# Patient Record
Sex: FEMALE | URBAN - METROPOLITAN AREA
[De-identification: names, ages, dates, MRNs, and addresses within clinical notes are randomized per-mention and may not be internally consistent; named-entity substitution may affect disease eponyms.]

---

## 2017-04-17 ENCOUNTER — APPOINTMENT (RX ONLY)
Dept: URBAN - METROPOLITAN AREA CLINIC 12 | Facility: CLINIC | Age: 67
Setting detail: DERMATOLOGY
End: 2017-04-17

## 2017-04-17 DIAGNOSIS — L21.8 OTHER SEBORRHEIC DERMATITIS: ICD-10-CM

## 2017-04-17 DIAGNOSIS — L94.2 CALCINOSIS CUTIS: ICD-10-CM

## 2017-04-17 DIAGNOSIS — D18.0 HEMANGIOMA: ICD-10-CM

## 2017-04-17 PROBLEM — L29.8 OTHER PRURITUS: Status: ACTIVE | Noted: 2017-04-17

## 2017-04-17 PROBLEM — D18.01 HEMANGIOMA OF SKIN AND SUBCUTANEOUS TISSUE: Status: ACTIVE | Noted: 2017-04-17

## 2017-04-17 PROCEDURE — ? COUNSELING

## 2017-04-17 PROCEDURE — 99203 OFFICE O/P NEW LOW 30 MIN: CPT

## 2017-04-17 PROCEDURE — ? TREATMENT REGIMEN

## 2017-04-17 PROCEDURE — ? IN-HOUSE DISPENSING PHARMACY

## 2017-04-17 ASSESSMENT — LOCATION ZONE DERM
LOCATION ZONE: TRUNK
LOCATION ZONE: FACE
LOCATION ZONE: EYELID

## 2017-04-17 ASSESSMENT — LOCATION SIMPLE DESCRIPTION DERM
LOCATION SIMPLE: LEFT EYEBROW
LOCATION SIMPLE: RIGHT FOREHEAD
LOCATION SIMPLE: RIGHT SUPERIOR EYELID
LOCATION SIMPLE: CHEST
LOCATION SIMPLE: INFERIOR FOREHEAD

## 2017-04-17 ASSESSMENT — LOCATION DETAILED DESCRIPTION DERM
LOCATION DETAILED: RIGHT MEDIAL SUPERIOR EYELID
LOCATION DETAILED: LEFT CENTRAL EYEBROW
LOCATION DETAILED: RIGHT FOREHEAD
LOCATION DETAILED: INFERIOR MID FOREHEAD
LOCATION DETAILED: RIGHT LATERAL SUPERIOR CHEST

## 2017-04-17 NOTE — PROCEDURE: MIPS QUALITY
Quality 111:Pneumonia Vaccination Status For Older Adults: Pneumococcal Vaccination Previously Received
Quality 110: Preventive Care And Screening: Influenza Immunization: Influenza Immunization Administered during Influenza season
Quality 130: Documentation Of Current Medications In The Medical Record: Current Medications Documented
Detail Level: Detailed

## 2017-04-17 NOTE — PROCEDURE: IN-HOUSE DISPENSING PHARMACY
Product 52 Amount/Unit (Numbers Only): 0
Product 62 Unit Type: mg
Name Of Product 4: [place] 0.1% Gel [id number] - Niacinamide 4%/Tazoratene 0.1%
Product 2 Unit Type: ml
Product 32 Amount/Unit (Numbers Only): 15
Product 25 Units Dispensed: 1
Name Of Product 31: Antibacterial Wound Ointment [id number] - Aloe Vera 0.2%/Lidocaine 2%/Mupirocin 2%/Tranilast 1%
Product 2 Application Directions: Apply one pump (pea-sized amount) to entire face at bed time
Product 11 Application Directions: Apply to Affected areas three times weekly for one month.
Product 32 Price/Unit (In Dollars): 35.00
Product 4 Application Directions: Apply pea-sized amount to affected areas QHS as tolerated.  5 refills
Product 22 Amount/Unit (Numbers Only): 60
Name Of Product 2: Tret 0.05% Cream [id number] - Niacinamide 4%/Tretinoin 0.05%
Name Of Product 22: Clob Ointment [id number] - Clobetasol Propionate 0.05%/Niacinamide 4%
Product 31 Amount/Unit (Numbers Only): 30
Product 4 Price/Unit (In Dollars): 45.00
Product 23 Amount/Unit (Numbers Only): 30
Name Of Product 11: Imiquimod Combo Gel [id number] - Imiquimod 5%/Levocetirizine 1%/Tretinoin 0.05%
Name Of Product 26: Fluocinolone Scalp & Body Oil [id number] - Fluocinolone Acetonide 0.01% In Emu and Olive Oil
Product 15 Application Directions: Apply to warts every night
Product 22 Unit Type: grams
Product 26 Amount/Unit (Numbers Only): 60
Name Of Product 24: Triam Dermatitis Cream [id number] - Niacinamide 4%/Triamcinolone Acetonide 0.1%
Product 25 Application Directions: Apply to affected areas twice daily as needed.  Refills x 2
Name Of Product 25: Desonide Dermatitis Cream [id number] - Desonide 0.05%/Niacinamide 4%
Product 31 Application Directions: Apply to wound QD-BID for 1-2 weeks
Product 15 Price/Unit (In Dollars): 35
Detail Level: Simple
Name Of Product 32: Anti-Fungal Nail Solution [id number] - Ciclopirox 8%/Fluconazole 1%/Terbinafine 1%
Name Of Product 3: Tret 0.1% Cream 557112 - Niacinamide 4%/Tretinoin 0.1%
Product 12 Application Directions: Apply to face QD
Product 22 Price/Unit (In Dollars): 40.00
Name Of Product 12: Rosacea Cream [id number] - Ivermectin 1%/Metronidazole 1%/Niacinamide 4%/Potassium Azeloyl Diglycinate 5%
Name Of Product 23: Clob Cream [id number] - Clobetasol Propionate 0.05%/Niacinamide 4%
Product 31 Price/Unit (In Dollars): 30.00
Product 23 Application Directions: Apply to. Affected areas BID x2 weeks
Render Product Pricing In Note: Yes
Name Of Product 21: Clob Solution [id number] - Clobetasol Propionate 0.05%/Niacinamide 4%
Name Of Product 33: Wart Gel [id number] - Cimetidine 5%/Ibuprofen 2%/Salicylic Acid 17%
Name Of Product 1: Tret 0.025% Cream [id number] - Niacinamide 4%/Tretinoin 0.025%
Product 15 Amount/Unit (Numbers Only): 35

## 2018-01-29 NOTE — HPI: EVALUATION OF SKIN LESION(S)
How Severe Are Your Spot(S)?: mild
Have Your Spot(S) Been Treated In The Past?: has not been treated
Hpi Title: Evaluation of Skin Lesions
dietitian/nutrition services

## 2019-08-26 ENCOUNTER — APPOINTMENT (RX ONLY)
Dept: URBAN - METROPOLITAN AREA CLINIC 12 | Facility: CLINIC | Age: 69
Setting detail: DERMATOLOGY
End: 2019-08-26

## 2019-08-26 DIAGNOSIS — L57.0 ACTINIC KERATOSIS: ICD-10-CM

## 2019-08-26 DIAGNOSIS — L21.8 OTHER SEBORRHEIC DERMATITIS: ICD-10-CM

## 2019-08-26 DIAGNOSIS — Z71.89 OTHER SPECIFIED COUNSELING: ICD-10-CM

## 2019-08-26 DIAGNOSIS — D18.0 HEMANGIOMA: ICD-10-CM

## 2019-08-26 PROBLEM — D18.01 HEMANGIOMA OF SKIN AND SUBCUTANEOUS TISSUE: Status: ACTIVE | Noted: 2019-08-26

## 2019-08-26 PROCEDURE — 17003 DESTRUCT PREMALG LES 2-14: CPT

## 2019-08-26 PROCEDURE — ? LIQUID NITROGEN

## 2019-08-26 PROCEDURE — ? TREATMENT REGIMEN

## 2019-08-26 PROCEDURE — 17000 DESTRUCT PREMALG LESION: CPT

## 2019-08-26 PROCEDURE — 99214 OFFICE O/P EST MOD 30 MIN: CPT | Mod: 25

## 2019-08-26 PROCEDURE — ? COUNSELING

## 2019-08-26 ASSESSMENT — LOCATION DETAILED DESCRIPTION DERM
LOCATION DETAILED: RIGHT MEDIAL TEMPLE
LOCATION DETAILED: LEFT CENTRAL EYEBROW
LOCATION DETAILED: RIGHT MEDIAL ZYGOMA
LOCATION DETAILED: RIGHT LATERAL SUPERIOR CHEST
LOCATION DETAILED: RIGHT CENTRAL EYEBROW

## 2019-08-26 ASSESSMENT — LOCATION SIMPLE DESCRIPTION DERM
LOCATION SIMPLE: RIGHT EYEBROW
LOCATION SIMPLE: RIGHT ZYGOMA
LOCATION SIMPLE: RIGHT TEMPLE
LOCATION SIMPLE: LEFT EYEBROW
LOCATION SIMPLE: CHEST

## 2019-08-26 ASSESSMENT — PAIN INTENSITY VAS: HOW INTENSE IS YOUR PAIN 0 BEING NO PAIN, 10 BEING THE MOST SEVERE PAIN POSSIBLE?: NO PAIN

## 2019-08-26 ASSESSMENT — LOCATION ZONE DERM
LOCATION ZONE: FACE
LOCATION ZONE: TRUNK

## 2019-08-26 ASSESSMENT — SEVERITY ASSESSMENT: HOW SEVERE IS THIS PATIENT'S CONDITION?: MILD

## 2019-08-26 NOTE — PROCEDURE: LIQUID NITROGEN
Render Note In Bullet Format When Appropriate: No
Number Of Freeze-Thaw Cycles: 2 freeze-thaw cycles
Consent: The patient's consent was obtained including but not limited to risks of crusting, scabbing, blistering, scarring, darker or lighter pigmentary change, recurrence, incomplete removal and infection.
Duration Of Freeze Thaw-Cycle (Seconds): 5
Detail Level: Detailed
Post-Care Instructions: I reviewed with the patient in detail post-care instructions. Patient is to wear sunprotection, and avoid picking at any of the treated lesions. Pt may apply Vaseline to crusted or scabbing areas.

## 2019-08-26 NOTE — PROCEDURE: COUNSELING
Detail Level: Simple
Patient Specific Counseling (Will Not Stick From Patient To Patient): Recommended 50 SPF for patient
Detail Level: Generalized

## 2020-01-21 ENCOUNTER — APPOINTMENT (RX ONLY)
Dept: URBAN - METROPOLITAN AREA CLINIC 12 | Facility: CLINIC | Age: 70
Setting detail: DERMATOLOGY
End: 2020-01-21

## 2020-01-21 DIAGNOSIS — L82.0 INFLAMED SEBORRHEIC KERATOSIS: ICD-10-CM

## 2020-01-21 DIAGNOSIS — Z41.9 ENCOUNTER FOR PROCEDURE FOR PURPOSES OTHER THAN REMEDYING HEALTH STATE, UNSPECIFIED: ICD-10-CM

## 2020-01-21 PROCEDURE — ? COSMETIC CONSULTATION: BOTULINUM TOXIN

## 2020-01-21 PROCEDURE — 17110 DESTRUCTION B9 LES UP TO 14: CPT

## 2020-01-21 PROCEDURE — ? COSMETIC CONSULTATION - MICRO-NEEDLING

## 2020-01-21 PROCEDURE — ? LIQUID NITROGEN

## 2020-01-21 ASSESSMENT — LOCATION DETAILED DESCRIPTION DERM
LOCATION DETAILED: RIGHT POSTERIOR SHOULDER
LOCATION DETAILED: LEFT SUPERIOR CENTRAL BUCCAL CHEEK

## 2020-01-21 ASSESSMENT — LOCATION SIMPLE DESCRIPTION DERM
LOCATION SIMPLE: RIGHT SHOULDER
LOCATION SIMPLE: LEFT CHEEK

## 2020-01-21 ASSESSMENT — LOCATION ZONE DERM
LOCATION ZONE: FACE
LOCATION ZONE: ARM

## 2020-01-21 NOTE — PROCEDURE: LIQUID NITROGEN
Duration Of Freeze Thaw-Cycle (Seconds): 5-10
Include Z78.9 (Other Specified Conditions Influencing Health Status) As An Associated Diagnosis?: No
Post-Care Instructions: No charge per MM
Medical Necessity Information: It is in your best interest to select a reason for this procedure from the list below. All of these items fulfill various CMS LCD requirements except the new and changing color options.
Number Of Freeze-Thaw Cycles: 2 freeze-thaw cycles
Detail Level: Detailed
Medical Necessity Clause: This procedure was medically necessary because the lesions that were treated were: irritated

## 2020-09-14 ENCOUNTER — APPOINTMENT (RX ONLY)
Dept: URBAN - METROPOLITAN AREA CLINIC 12 | Facility: CLINIC | Age: 70
Setting detail: DERMATOLOGY
End: 2020-09-14

## 2020-09-14 VITALS — TEMPERATURE: 97.7 F

## 2020-09-14 DIAGNOSIS — L82.1 OTHER SEBORRHEIC KERATOSIS: ICD-10-CM

## 2020-09-14 DIAGNOSIS — D18.0 HEMANGIOMA: ICD-10-CM

## 2020-09-14 PROBLEM — D18.01 HEMANGIOMA OF SKIN AND SUBCUTANEOUS TISSUE: Status: ACTIVE | Noted: 2020-09-14

## 2020-09-14 PROCEDURE — 99214 OFFICE O/P EST MOD 30 MIN: CPT

## 2020-09-14 ASSESSMENT — LOCATION DETAILED DESCRIPTION DERM
LOCATION DETAILED: RIGHT LATERAL SUPERIOR CHEST
LOCATION DETAILED: LEFT INFERIOR MEDIAL UPPER BACK

## 2020-09-14 ASSESSMENT — LOCATION ZONE DERM: LOCATION ZONE: TRUNK

## 2020-09-14 ASSESSMENT — PAIN INTENSITY VAS: HOW INTENSE IS YOUR PAIN 0 BEING NO PAIN, 10 BEING THE MOST SEVERE PAIN POSSIBLE?: NO PAIN

## 2020-09-14 ASSESSMENT — LOCATION SIMPLE DESCRIPTION DERM
LOCATION SIMPLE: LEFT UPPER BACK
LOCATION SIMPLE: CHEST

## 2021-09-20 ENCOUNTER — APPOINTMENT (RX ONLY)
Dept: URBAN - METROPOLITAN AREA CLINIC 12 | Facility: CLINIC | Age: 71
Setting detail: DERMATOLOGY
End: 2021-09-20

## 2021-09-20 DIAGNOSIS — D18.0 HEMANGIOMA: ICD-10-CM

## 2021-09-20 DIAGNOSIS — L82.1 OTHER SEBORRHEIC KERATOSIS: ICD-10-CM

## 2021-09-20 DIAGNOSIS — L81.4 OTHER MELANIN HYPERPIGMENTATION: ICD-10-CM

## 2021-09-20 DIAGNOSIS — Z87.2 PERSONAL HISTORY OF DISEASES OF THE SKIN AND SUBCUTANEOUS TISSUE: ICD-10-CM

## 2021-09-20 DIAGNOSIS — F42.4 EXCORIATION (SKIN-PICKING) DISORDER: ICD-10-CM

## 2021-09-20 DIAGNOSIS — D22 MELANOCYTIC NEVI: ICD-10-CM

## 2021-09-20 DIAGNOSIS — Z71.89 OTHER SPECIFIED COUNSELING: ICD-10-CM

## 2021-09-20 DIAGNOSIS — Z80.8 FAMILY HISTORY OF MALIGNANT NEOPLASM OF OTHER ORGANS OR SYSTEMS: ICD-10-CM

## 2021-09-20 PROBLEM — S20.402A UNSPECIFIED SUPERFICIAL INJURIES OF LEFT BACK WALL OF THORAX, INITIAL ENCOUNTER: Status: ACTIVE | Noted: 2021-09-20

## 2021-09-20 PROBLEM — D18.01 HEMANGIOMA OF SKIN AND SUBCUTANEOUS TISSUE: Status: ACTIVE | Noted: 2021-09-20

## 2021-09-20 PROBLEM — D22.5 MELANOCYTIC NEVI OF TRUNK: Status: ACTIVE | Noted: 2021-09-20

## 2021-09-20 PROCEDURE — ? SUNSCREEN RECOMMENDATIONS

## 2021-09-20 PROCEDURE — 99213 OFFICE O/P EST LOW 20 MIN: CPT

## 2021-09-20 PROCEDURE — ? COUNSELING

## 2021-09-20 ASSESSMENT — LOCATION DETAILED DESCRIPTION DERM
LOCATION DETAILED: RIGHT CLAVICULAR NECK
LOCATION DETAILED: RIGHT PROXIMAL POSTERIOR THIGH
LOCATION DETAILED: RIGHT LATERAL SUPERIOR CHEST
LOCATION DETAILED: LEFT ANTERIOR PROXIMAL UPPER ARM
LOCATION DETAILED: EPIGASTRIC SKIN
LOCATION DETAILED: LEFT CLAVICULAR SKIN
LOCATION DETAILED: RIGHT MEDIAL SUPERIOR CHEST
LOCATION DETAILED: LEFT SUPERIOR UPPER BACK

## 2021-09-20 ASSESSMENT — PAIN INTENSITY VAS: HOW INTENSE IS YOUR PAIN 0 BEING NO PAIN, 10 BEING THE MOST SEVERE PAIN POSSIBLE?: NO PAIN

## 2021-09-20 ASSESSMENT — LOCATION SIMPLE DESCRIPTION DERM
LOCATION SIMPLE: LEFT CLAVICULAR SKIN
LOCATION SIMPLE: CHEST
LOCATION SIMPLE: LEFT UPPER ARM
LOCATION SIMPLE: ABDOMEN
LOCATION SIMPLE: RIGHT ANTERIOR NECK
LOCATION SIMPLE: RIGHT POSTERIOR THIGH
LOCATION SIMPLE: LEFT UPPER BACK

## 2021-09-20 ASSESSMENT — LOCATION ZONE DERM
LOCATION ZONE: TRUNK
LOCATION ZONE: ARM
LOCATION ZONE: NECK
LOCATION ZONE: LEG

## 2021-09-20 NOTE — PROCEDURE: SUNSCREEN RECOMMENDATIONS

## 2022-09-19 ENCOUNTER — APPOINTMENT (RX ONLY)
Dept: URBAN - METROPOLITAN AREA CLINIC 12 | Facility: CLINIC | Age: 72
Setting detail: DERMATOLOGY
End: 2022-09-19

## 2022-09-19 DIAGNOSIS — L81.4 OTHER MELANIN HYPERPIGMENTATION: ICD-10-CM

## 2022-09-19 DIAGNOSIS — Z87.2 PERSONAL HISTORY OF DISEASES OF THE SKIN AND SUBCUTANEOUS TISSUE: ICD-10-CM

## 2022-09-19 DIAGNOSIS — Z00.00 ENCOUNTER FOR GENERAL ADULT MEDICAL EXAMINATION WITHOUT ABNORMAL FINDINGS: ICD-10-CM

## 2022-09-19 DIAGNOSIS — L65.9 NONSCARRING HAIR LOSS, UNSPECIFIED: ICD-10-CM

## 2022-09-19 DIAGNOSIS — Z80.8 FAMILY HISTORY OF MALIGNANT NEOPLASM OF OTHER ORGANS OR SYSTEMS: ICD-10-CM

## 2022-09-19 DIAGNOSIS — D22 MELANOCYTIC NEVI: ICD-10-CM

## 2022-09-19 PROBLEM — D22.5 MELANOCYTIC NEVI OF TRUNK: Status: ACTIVE | Noted: 2022-09-19

## 2022-09-19 PROCEDURE — 99213 OFFICE O/P EST LOW 20 MIN: CPT

## 2022-09-19 PROCEDURE — ? COUNSELING

## 2022-09-19 ASSESSMENT — LOCATION DETAILED DESCRIPTION DERM
LOCATION DETAILED: RIGHT MEDIAL UPPER BACK
LOCATION DETAILED: INFERIOR MID FOREHEAD
LOCATION DETAILED: LEFT MEDIAL FRONTAL SCALP
LOCATION DETAILED: LEFT SUPERIOR FOREHEAD
LOCATION DETAILED: SUPERIOR THORACIC SPINE

## 2022-09-19 ASSESSMENT — LOCATION ZONE DERM
LOCATION ZONE: TRUNK
LOCATION ZONE: FACE
LOCATION ZONE: SCALP

## 2022-09-19 ASSESSMENT — LOCATION SIMPLE DESCRIPTION DERM
LOCATION SIMPLE: UPPER BACK
LOCATION SIMPLE: RIGHT UPPER BACK
LOCATION SIMPLE: LEFT SCALP
LOCATION SIMPLE: LEFT FOREHEAD
LOCATION SIMPLE: INFERIOR FOREHEAD

## 2022-09-19 NOTE — PROCEDURE: REASSURANCE
Detail Level: Zone
Hide Additional Notes?: No
Detail Level: Generalized
Include Location In Plan?: Yes
Additional Notes (Optional): Patient states she was going to schedule an appointment 5 months ago for hair loss occurring around time of COVID vaccine administration, regrowth present at time of visit

## 2022-09-19 NOTE — PROCEDURE: MIPS QUALITY
Quality 111:Pneumonia Vaccination Status For Older Adults: Pneumococcal vaccine (PPSV23) administered on or after patient’s 60th birthday and before the end of the measurement period
Quality 130: Documentation Of Current Medications In The Medical Record: Current Medications Documented
Detail Level: Generalized
Quality 431: Preventive Care And Screening: Unhealthy Alcohol Use - Screening: Patient not identified as an unhealthy alcohol user when screened for unhealthy alcohol use using a systematic screening method
Quality 110: Preventive Care And Screening: Influenza Immunization: Influenza Immunization previously received during influenza season

## 2023-09-19 ENCOUNTER — APPOINTMENT (RX ONLY)
Dept: URBAN - METROPOLITAN AREA CLINIC 12 | Facility: CLINIC | Age: 73
Setting detail: DERMATOLOGY
End: 2023-09-19

## 2023-09-19 DIAGNOSIS — L21.8 OTHER SEBORRHEIC DERMATITIS: ICD-10-CM

## 2023-09-19 DIAGNOSIS — L82.0 INFLAMED SEBORRHEIC KERATOSIS: ICD-10-CM

## 2023-09-19 DIAGNOSIS — D18.0 HEMANGIOMA: ICD-10-CM

## 2023-09-19 DIAGNOSIS — L81.4 OTHER MELANIN HYPERPIGMENTATION: ICD-10-CM

## 2023-09-19 DIAGNOSIS — Z71.89 OTHER SPECIFIED COUNSELING: ICD-10-CM

## 2023-09-19 DIAGNOSIS — L90.8 OTHER ATROPHIC DISORDERS OF SKIN: ICD-10-CM

## 2023-09-19 DIAGNOSIS — D22 MELANOCYTIC NEVI: ICD-10-CM

## 2023-09-19 DIAGNOSIS — Z87.2 PERSONAL HISTORY OF DISEASES OF THE SKIN AND SUBCUTANEOUS TISSUE: ICD-10-CM

## 2023-09-19 PROBLEM — D22.5 MELANOCYTIC NEVI OF TRUNK: Status: ACTIVE | Noted: 2023-09-19

## 2023-09-19 PROBLEM — D18.01 HEMANGIOMA OF SKIN AND SUBCUTANEOUS TISSUE: Status: ACTIVE | Noted: 2023-09-19

## 2023-09-19 PROCEDURE — ? LIQUID NITROGEN

## 2023-09-19 PROCEDURE — ? PRESCRIPTION

## 2023-09-19 PROCEDURE — 99213 OFFICE O/P EST LOW 20 MIN: CPT | Mod: 25

## 2023-09-19 PROCEDURE — 17110 DESTRUCTION B9 LES UP TO 14: CPT

## 2023-09-19 PROCEDURE — ? COUNSELING

## 2023-09-19 PROCEDURE — ? TREATMENT REGIMEN

## 2023-09-19 RX ORDER — KETOCONAZOLE 20 MG/G
CREAM TOPICAL
Qty: 30 | Refills: 2 | Status: ERX | COMMUNITY
Start: 2023-09-19

## 2023-09-19 RX ORDER — HYDROCORTISONE 25 MG/G
CREAM TOPICAL BID
Qty: 30 | Refills: 2 | Status: ERX | COMMUNITY
Start: 2023-09-19

## 2023-09-19 RX ADMIN — KETOCONAZOLE: 20 CREAM TOPICAL at 00:00

## 2023-09-19 RX ADMIN — HYDROCORTISONE: 25 CREAM TOPICAL at 00:00

## 2023-09-19 ASSESSMENT — LOCATION ZONE DERM
LOCATION ZONE: TRUNK
LOCATION ZONE: FACE

## 2023-09-19 ASSESSMENT — LOCATION DETAILED DESCRIPTION DERM
LOCATION DETAILED: RIGHT INFERIOR CENTRAL MALAR CHEEK
LOCATION DETAILED: INFERIOR MID FOREHEAD
LOCATION DETAILED: RIGHT CENTRAL EYEBROW
LOCATION DETAILED: EPIGASTRIC SKIN
LOCATION DETAILED: LEFT MEDIAL SUPERIOR CHEST
LOCATION DETAILED: LEFT INFERIOR CENTRAL MALAR CHEEK
LOCATION DETAILED: LEFT INFERIOR UPPER BACK
LOCATION DETAILED: LEFT CENTRAL EYEBROW
LOCATION DETAILED: SUPERIOR THORACIC SPINE
LOCATION DETAILED: LEFT MEDIAL MALAR CHEEK

## 2023-09-19 ASSESSMENT — LOCATION SIMPLE DESCRIPTION DERM
LOCATION SIMPLE: LEFT CHEEK
LOCATION SIMPLE: ABDOMEN
LOCATION SIMPLE: RIGHT EYEBROW
LOCATION SIMPLE: CHEST
LOCATION SIMPLE: INFERIOR FOREHEAD
LOCATION SIMPLE: LEFT EYEBROW
LOCATION SIMPLE: LEFT UPPER BACK
LOCATION SIMPLE: RIGHT CHEEK
LOCATION SIMPLE: UPPER BACK

## 2023-09-19 NOTE — PROCEDURE: COUNSELING
Detail Level: Zone
Detail Level: Simple
Patient Specific Counseling (Will Not Stick From Patient To Patient): Recommended 50 SPF for patient
Detail Level: Generalized
Sunscreen Recommendations: Broad spectrum sunscreen of 30+
Sunscreen Recommendations: Broad Spectrum Sunscreen 30+
Detail Level: Detailed

## 2023-09-19 NOTE — HPI: RASH
Is This A New Presentation, Or A Follow-Up?: Rash
Additional History: Patient has been given samples in the past but does not recall of what.

## 2023-09-19 NOTE — PROCEDURE: TREATMENT REGIMEN
Detail Level: Zone
Initiate Treatment: Hydrocortisone 2.5% cream apply bid for 1-2 weeks then continue Ketoconazole cream long term applying BID for long term maintenance
Plan: Start topical creams but only use hydrocortisone for 1-2 weeks and continue ketoconazole long term for maintenance. Will follow up PRN.
Plan: Refer to aestheticians for consultation

## 2023-09-19 NOTE — PROCEDURE: LIQUID NITROGEN
Show Topical Anesthesia Variable?: Yes
Number Of Freeze-Thaw Cycles: 2 freeze-thaw cycles
Medical Necessity Information: It is in your best interest to select a reason for this procedure from the list below. All of these items fulfill various CMS LCD requirements except the new and changing color options.
Render Post-Care Instructions In Note?: no
Detail Level: Detailed
Medical Necessity Clause: This procedure was medically necessary because the lesions that were treated were: irritated
Duration Of Freeze Thaw-Cycle (Seconds): 10
Spray Paint Text: The liquid nitrogen was applied to the skin utilizing a spray paint frosting technique.
Post-Care Instructions: I reviewed with the patient in detail post-care instructions. Patient is to wear sunprotection, and avoid picking at any of the treated lesions. Pt may apply Vaseline to crusted or scabbing areas.
Consent: The patient's consent was obtained including but not limited to risks of crusting, scabbing, blistering, scarring, darker or lighter pigmentary change, recurrence, incomplete removal and infection.

## 2025-01-21 ENCOUNTER — APPOINTMENT (OUTPATIENT)
Dept: URBAN - METROPOLITAN AREA CLINIC 12 | Facility: CLINIC | Age: 75
Setting detail: DERMATOLOGY
End: 2025-01-21

## 2025-01-21 DIAGNOSIS — D22 MELANOCYTIC NEVI: ICD-10-CM

## 2025-01-21 DIAGNOSIS — Z71.89 OTHER SPECIFIED COUNSELING: ICD-10-CM

## 2025-01-21 DIAGNOSIS — L21.8 OTHER SEBORRHEIC DERMATITIS: ICD-10-CM

## 2025-01-21 DIAGNOSIS — L82.1 OTHER SEBORRHEIC KERATOSIS: ICD-10-CM

## 2025-01-21 DIAGNOSIS — D18.0 HEMANGIOMA: ICD-10-CM

## 2025-01-21 DIAGNOSIS — L81.4 OTHER MELANIN HYPERPIGMENTATION: ICD-10-CM

## 2025-01-21 PROBLEM — D22.5 MELANOCYTIC NEVI OF TRUNK: Status: ACTIVE | Noted: 2025-01-21

## 2025-01-21 PROBLEM — D18.01 HEMANGIOMA OF SKIN AND SUBCUTANEOUS TISSUE: Status: ACTIVE | Noted: 2025-01-21

## 2025-01-21 PROCEDURE — 99213 OFFICE O/P EST LOW 20 MIN: CPT

## 2025-01-21 PROCEDURE — ? COUNSELING

## 2025-01-21 PROCEDURE — ? TREATMENT REGIMEN

## 2025-01-21 ASSESSMENT — LOCATION DETAILED DESCRIPTION DERM
LOCATION DETAILED: LEFT CENTRAL EYEBROW
LOCATION DETAILED: RIGHT MEDIAL UPPER BACK
LOCATION DETAILED: RIGHT MID-UPPER BACK
LOCATION DETAILED: RIGHT CENTRAL EYEBROW
LOCATION DETAILED: RIGHT SUPERIOR UPPER BACK
LOCATION DETAILED: INFERIOR MID FOREHEAD

## 2025-01-21 ASSESSMENT — LOCATION SIMPLE DESCRIPTION DERM
LOCATION SIMPLE: RIGHT UPPER BACK
LOCATION SIMPLE: LEFT EYEBROW
LOCATION SIMPLE: RIGHT EYEBROW
LOCATION SIMPLE: INFERIOR FOREHEAD

## 2025-01-21 ASSESSMENT — PAIN INTENSITY VAS: HOW INTENSE IS YOUR PAIN 0 BEING NO PAIN, 10 BEING THE MOST SEVERE PAIN POSSIBLE?: NO PAIN

## 2025-01-21 ASSESSMENT — LOCATION ZONE DERM
LOCATION ZONE: TRUNK
LOCATION ZONE: FACE

## 2025-01-21 ASSESSMENT — SEVERITY ASSESSMENT: HOW SEVERE IS THIS PATIENT'S CONDITION?: MILD

## 2025-01-21 NOTE — PROCEDURE: TREATMENT REGIMEN
Modify Regimen: Hydrocortisone 2.5% cream apply bid for up to a month for flares\\nKetoconazole cream long term applying BID for long term maintenance\\n\\nNo refills needed at this time.
Detail Level: Zone
Plan: Reinforced how to use medications for best efficacy. Pt requests cloderm - no longer made. Pt asks if meds can be used for mosquito bites- discussed hydrocortisone can be used for mosquito bites if needed